# Patient Record
Sex: FEMALE | Race: ASIAN
[De-identification: names, ages, dates, MRNs, and addresses within clinical notes are randomized per-mention and may not be internally consistent; named-entity substitution may affect disease eponyms.]

---

## 2017-08-15 NOTE — PDGENHP
History and Physical





- Chief Complaint


L breast infection 





- History of Present Illness


31yo F s/p double mastectomy c recon followed by my partner Dr Renae. Patient 

has hx of breast infection, originally resolved successfully with IV abx in 

2015. Since then has felt well until recently. Recently relocated from [place] area 

where surgery was performed. Was placed on abx last week Friday for cellulitis 

and fevers; fevers subsequently broke, erythema improved but induration and 

swelling in breast remained. MR today showed multiple fluid collections c/w 

superinfection deep to capsule and adjacent to implant. 





History Information





- Allergies/Home Medication List


Allergies/Adverse Reactions: 








cephalexin Allergy (Verified 08/15/17 17:09)


 





Home Medications: 








Amoxicillin/Clavulanate Pot [Augmentin 875 MG TAB (*)] 875 mg PO BID 08/15/17 [

Last Taken 08/15/17 09:00]


Tamoxifen Citrate [Nolvadex 10 MG (*)] 20 mg PO DAILY 08/15/17 [Last Taken 

Unknown]





I have personally reviewed and updated: family history, medical history, social 

history, surgical history


Past Medical History: breast ca, eczema





- Surgical History


Additional surgical history: linh mastectomies c recon





- Family History


Positive for: non-pertinent





- Social History


Smoking Status: Never smoked


Alcohol Use: None


Drug Use: None





Review of Systems


ROS: 10pt was reviewed & negative except for what was stated in HPI & below





Physical Exam














Temp Pulse Resp BP Pulse Ox


 


 37.3 C   90   20   99/68 L  96 


 


 08/15/17 20:36  08/15/17 20:36  08/15/17 20:36  08/15/17 20:36  08/15/17 20:36











Constitutional: no apparent distress, appears nourished, not in pain


Eyes: PERRL, anicteric sclera, EOMI


Ears, Nose, Mouth, Throat: moist mucous membranes, hearing normal, ears appear 

normal, no oral mucosal ulcers


Cardiovascular: regular rate and rhythym, no murmur, rub, or gallop, No edema


Respiratory: no respiratory distress, no rales or rhonchi, clear to auscultation

, other (L breast indurated, swollen and fixed. relatively nontender, minimal 

erythema, no draining sinuses )


Gastrointestinal: normoactive bowel sounds, soft, non-tender abdomen, no 

palpable masses


Skin: warm, normal color


Musculoskeletal: full muscle strength


Neurologic: AAOx3


Psychiatric: interacting appropriately


Lymph, Heme, Immunologic: no cervical LAD





Lab Data & Imaging Review





 08/15/17 19:45














Sodium  141 mEq/L (134-144)   08/15/17  19:45    


 


Potassium  4.2 mEq/L (3.5-5.2)   08/15/17  19:45    


 


Chloride  101 mEq/L ()   08/15/17  19:45    


 


Carbon Dioxide  27 mEq/l (22-31)   08/15/17  19:45    


 


Anion Gap  13 mEq/L (8-16)   08/15/17  19:45    


 


BUN  16 mg/dL (7-23)   08/15/17  19:45    


 


Creatinine  0.9 mg/dL (0.6-1.0)   08/15/17  19:45    


 


Estimated GFR  > 60   08/15/17  19:45    


 


Glucose  101 mg/dL ()  H  08/15/17  19:45    


 


Calcium  9.2 mg/dL (8.5-10.4)   08/15/17  19:45    








Visualized and Interpreted imaging results: Yes


Interpretation: MR breast performed earlier today shows multiple fluid 

collections deep to the capsule c/w superinfection





Assessment & Plan


Assessment: 





Breast infection 


Plan: 





will start IV antibiotics tonight, worked in the past but given MR findings 

doubt will be successful, but given vast improvement with just a weekend of PO 

Augmentin its worth a shot. Will have plastic surgery consult in AM to help 

determine best plan. NPO at [place], home meds restarted.

## 2017-08-16 NOTE — PDANEPAE
ANE History of Present Illness





Infected breast implant





ANE Past Medical History





- Pulmonary History


Hx Sleep Apnea: No


Sleep Apnea Screening Result - Last Documented: Negative





- Endocrine History


Hx Diabetes: No





ANE Patient History





- Allergies


Allergies/Adverse Reactions: 








cephalexin Allergy (Verified 08/15/17 17:09)


 








- Home Medications


Home Medications: 








Amoxicillin/Clavulanate Pot [Augmentin 875 MG TAB (*)] 875 mg PO BID 08/15/17 [

Last Taken 08/15/17 09:00]


Tamoxifen Citrate [Nolvadex 10 MG (*)] 20 mg PO DAILY 08/15/17 [Last Taken 

Unknown]








- NPO status


NPO Since - Liquids (Date): 08/16/17


NPO Since - Liquids (Time): 12:30


NPO Since - Solids (Date): 08/15/17


NPO Since - Solids (Time): 18:30





- Smoking Hx


Smoking Status: Never smoked





- Alcohol Use


Alcohol Use: None





ANE Labs/Vital Signs





- Labs


Result Diagrams: 


 08/15/17 19:45





- Vital Signs


Blood Pressure: 100/67


Heart Rate: 75


Respiratory Rate: 14


O2 Sat (%): 95


Height: 167.64 cm


Weight: 58.967 kg





ANE Physical Exam





- Airway


Neck exam: FROM


Mallampati Score: Class 1


Mouth exam: normal dental/mouth exam





- Pulmonary


Pulmonary: no respiratory distress





- Cardiovascular


Cardiovascular: regular rate and rhythym





- ASA Status


ASA Status: I, E





ANE Anesthesia Plan


Anesthesia Plan: GA w LMA

## 2017-08-16 NOTE — SOAPPROG
SOAP Progress Note


Assessment/Plan: 


Assessment:


33yo F s/p left sided mastectomy c recon and hx of breast infection, originally 

resolved successfully with IV abx in 2015 who now has infection of the left 

breast implant not responding to PO abx.





MR shows multiple fluid collections c/w superinfection deep to capsule and 

adjacent to implant. 





Consult with plastics


Likely removal of implant 


Continue IV abx 


NPO until plan for surgery








S: Feels good. No pain. Denies F/C, N/V/D/C. Still hesitant about implant 

removal.


O: 


sitting up in bed. NAD


Afebrile 


MMM


RRR


Left breast induration and hardness, slight erythema








Objective: 





 Vital Signs











Temp Pulse Resp BP Pulse Ox


 


 37.2 C   83   16   100/72   95 


 


 08/16/17 08:00  08/16/17 08:00  08/16/17 08:00  08/16/17 08:00  08/16/17 08:00








 Laboratory Results





 08/15/17 19:45 





 











 08/15/17 08/16/17 08/17/17





 05:59 05:59 05:59


 


Intake Total  1100 


 


Output Total  0 


 


Balance  1100 














ICD10 Worksheet


Patient Problems: 


 Problems











Problem Status Onset


 


Breast abscess of female Acute  














- ICD10 Problem Qualifiers


(1) Breast abscess of female

## 2017-08-16 NOTE — GHP
[f rep st]



                                                            HISTORY AND PHYSICAL





DATE OF ADMISSION:  08/15/2017



PREOPERATIVE DIAGNOSIS:  Left breast infection.



POSTOPERATIVE DIAGNOSIS:  Left breast infection.



PROCEDURE:  Irrigation and debridement of left breast, with removal of reconstructive breast implant
.



ANESTHESIA:  General inhalational anesthesia.



ANESTHESIOLOGIST:  Jose Oquendo MD.



ESTIMATED BLOOD LOSS:  Less than 5 cc.



SPECIMENS:  Intraoperative cultures were taken.



DRAINS:  One ALEXANDRA drain was placed.



COMPLICATIONS:  No complications.



INDICATIONS FOR OPERATION:  Virginia is a 32-year-old  female who underwent a mastectomy at age
 27, with an implant reconstruction.  She developed a subsequent infection of the left breast that d
id not respond to antibiotics.  An MRI was obtained which showed periprosthetic fluid, and she was t
aken to the operating room for exploration and removal of her implant.



CLINICAL COURSE:  After risks and benefits of the procedure were explained to the patient, highlight
ing bleeding, infection, distortion of the breast mound, numbness, weakness, damage to underlying st
ructures, and the need for revisional procedures, formal operative consent was obtained.  She was jennifer muñiz to the operating room.



DESCRIPTION OF PROCEDURE:  After adequate inhalational general anesthesia was provided by Dr. Te ramírez, she was prepped and draped in normal sterile fashion.  The procedure began by excising 
her previous mastectomy skin in an area of very thin soft tissue coverage over the implant.  Immedia
tely in the subcutaneous space, thick yellow fluid was encountered.  It was cultured and drained wit
h suction.  The implant was immediately below the surface of the skin.  There was thin AlloDerm cove
rage.  The implant was removed.  The pocket was wiped clean, mechanically debrided, and irrigated wi
th vancomycin-containing normal saline.  She had a 15 round ALEXANDRA drain placed exiting through a separa
te lateral stab incision.  She had 10 cc of 0.25% plain Marcaine instilled into the pocket.  A 2-lay
er closure was performed.  Bacitracin, Xeroform, 4x4's, ABDs, and a compressive Ace wrap were applie
d in the operating room.  She was extubated in the OR, and taken to the recovery room awake and in s
table condition.





Job #:  [id number]

## 2017-08-16 NOTE — GCON
[f rep st]



                                                                    CONSULTATION





DATE OF CONSULTATION:  08/16/2017



REASON FOR CONSULTATION:  Left breast infection status post mastectomy with implant reconstruction.



REQUESTING PHYSICIAN:  Rochelle España.



CONSULTING PHYSICIAN:  Anthony Zamudio.



BRIEF CLINICAL HISTORY:  Patient is a 32-year-old,  female, who underwent a left-sided mastecto
my after breast cancer diagnosis 5 years ago.  She had a tissue expander placed with a subsequent im
plant exchange.  She developed fairly significant eczema of the left breast as well as diffusely thr
oughout her body.  She had radiation treatment to the left chest wall as well.  



She was admitted to the hospital yesterday when she developed swelling of the left breast and fever.
 She was placed on oral antibiotics last weekend.  Her fever improved but the swelling and induratio
n of the breasts worsened.  She was admitted yesterday. She underwent MRI evaluation of the left richelle
ast which revealed significant multiple loculated fluid collections suspicious for abscess and infec
tion adjacent to and deep to the breast implant and implant capsule.



PHYSICAL EXAMINATION:  Patient is non-ill-appearing.  She has significant swelling and shininess of 
the left breast.  There is fluid around the left reconstructive implant.  The area is mildly tender 
to palpation.  It is warmer than the surrounding skin.  There is mild erythema.  There is ptosis of 
the contralateral breast with significant asymmetry.  There are no masses in the contralateral breas
t.  There is no axillary lymphadenopathy on the right.  There are palpable axillary lymph nodes on t
he left.



IMPRESSION:  Periprosthetic infection left breast.



PLAN:  After discussing options with Virginia and her significant other, my recommendation was to ex
plore the left breast and remove the implant.  She will then require a delayed reconstruction after 
the infection is allowed to resolve.  We discussed wound healing difficulties in the face of previou
s radiation with recurrent infection as a possibility.  We discussed bleeding, significant distortio
n of the breast mound and chest wall following explantation, damage to underlying structures, nerves
 and muscles and need for additional procedures.  We discussed the need for the use of a drain with 
this procedure. 



All their questions were answered.  We will proceed with surgery later this afternoon.  Patient has 
been n.p.o. an appropriate amount of time.





Job #:  [id number]

## 2017-08-17 NOTE — SOAPPROG
SOAP Progress Note


Assessment/Plan: 


Assessment:


33yo F POD #1 s/p left sided implant removal for implant infection. 





Continue PO pain control 


Abx as directed 


Regular diet 





Dispo: to home today 








S: Feels good. No pain. Denies F/C, N/V/D/C. Passing flatus


O: 


sitting up in chair. NAD


Afebrile 


MMM


RRR


Chest is covered with ACE wrap








08/17/17 14:35





Objective: 





 Vital Signs











Temp Pulse Resp BP Pulse Ox


 


 36.6 C   76   17   86/60 L  96 


 


 08/17/17 12:00  08/17/17 12:00  08/17/17 12:00  08/17/17 12:00  08/17/17 12:00








 Microbiology











 08/16/17 17:56 Gram Stain - Final





 Breast - Eswab 








 Laboratory Results





 08/15/17 19:45 





 











 08/16/17 08/17/17 08/18/17





 05:59 05:59 05:59


 


Intake Total 1100 2256 


 


Output Total 0 20 5


 


Balance 1100 2236 -5














ICD10 Worksheet


Patient Problems: 


 Problems











Problem Status Onset


 


Breast abscess of female Acute  














- ICD10 Problem Qualifiers


(1) Breast abscess of female

## 2017-12-08 ENCOUNTER — HOSPITAL ENCOUNTER (OUTPATIENT)
Dept: HOSPITAL 80 - FIMAGING | Age: 32
End: 2017-12-08
Attending: INTERNAL MEDICINE
Payer: COMMERCIAL

## 2017-12-08 DIAGNOSIS — Z12.39: Primary | ICD-10-CM

## 2017-12-08 DIAGNOSIS — Z85.3: ICD-10-CM

## 2017-12-08 DIAGNOSIS — Z90.12: ICD-10-CM

## 2017-12-08 PROCEDURE — G0206 DX MAMMO INCL CAD UNI: HCPCS

## 2018-06-04 ENCOUNTER — HOSPITAL ENCOUNTER (OUTPATIENT)
Dept: HOSPITAL 80 - FIMAGING | Age: 33
End: 2018-06-04
Attending: INTERNAL MEDICINE
Payer: COMMERCIAL

## 2018-06-04 DIAGNOSIS — Z12.39: Primary | ICD-10-CM

## 2018-06-04 DIAGNOSIS — Z90.12: ICD-10-CM

## 2018-06-04 DIAGNOSIS — Z85.3: ICD-10-CM

## 2018-06-04 PROCEDURE — A9585 GADOBUTROL INJECTION: HCPCS

## 2018-06-04 PROCEDURE — C8908 MRI W/O FOL W/CONT, BREAST,: HCPCS

## 2018-06-04 PROCEDURE — 0159T: CPT

## 2018-06-04 PROCEDURE — 77059: CPT

## 2018-12-14 ENCOUNTER — HOSPITAL ENCOUNTER (OUTPATIENT)
Dept: HOSPITAL 80 - FIMAGING | Age: 33
End: 2018-12-14
Attending: INTERNAL MEDICINE
Payer: COMMERCIAL

## 2018-12-14 DIAGNOSIS — Z12.39: Primary | ICD-10-CM

## 2018-12-14 DIAGNOSIS — Z85.3: ICD-10-CM

## 2018-12-14 DIAGNOSIS — Z90.12: ICD-10-CM

## 2019-06-07 ENCOUNTER — HOSPITAL ENCOUNTER (OUTPATIENT)
Dept: HOSPITAL 80 - FIMAGING | Age: 34
End: 2019-06-07
Payer: COMMERCIAL